# Patient Record
Sex: MALE | Race: WHITE | NOT HISPANIC OR LATINO | URBAN - METROPOLITAN AREA
[De-identification: names, ages, dates, MRNs, and addresses within clinical notes are randomized per-mention and may not be internally consistent; named-entity substitution may affect disease eponyms.]

---

## 2023-11-20 ENCOUNTER — EMERGENCY (EMERGENCY)
Facility: HOSPITAL | Age: 58
LOS: 1 days | Discharge: HOME | End: 2023-11-20
Attending: EMERGENCY MEDICINE
Payer: MEDICAID

## 2023-11-20 VITALS
OXYGEN SATURATION: 98 % | HEART RATE: 69 BPM | WEIGHT: 199.96 LBS | RESPIRATION RATE: 18 BRPM | TEMPERATURE: 98 F | DIASTOLIC BLOOD PRESSURE: 79 MMHG | SYSTOLIC BLOOD PRESSURE: 119 MMHG | HEIGHT: 70 IN

## 2023-11-20 PROCEDURE — 99284 EMERGENCY DEPT VISIT MOD MDM: CPT | Mod: 25

## 2023-11-20 PROCEDURE — 73610 X-RAY EXAM OF ANKLE: CPT

## 2023-11-20 PROCEDURE — 73630 X-RAY EXAM OF FOOT: CPT

## 2023-11-20 PROCEDURE — 73630 X-RAY EXAM OF FOOT: CPT | Mod: 26,RT

## 2023-11-20 PROCEDURE — 99284 EMERGENCY DEPT VISIT MOD MDM: CPT

## 2023-11-20 PROCEDURE — 73610 X-RAY EXAM OF ANKLE: CPT | Mod: 26,RT

## 2023-11-20 NOTE — ED PROVIDER NOTE - PROGRESS NOTE DETAILS
Patient relates having a distant injury to same ankle.  States that he was told his ligament was injured and that he would need surgery.  States he had surgery decades ago and feels that he will need it for this injury.  Discussed plan of care with orthopedic follow-up.  Patient declined pain medication in ED.  Normal neurovascular exam. Patient able to stand using [employer].  However states pain with walking.  Demonstrated crutch walking.  Educated on orthopedic follow-up.

## 2023-11-20 NOTE — ED ADULT TRIAGE NOTE - CCCP TRG CHIEF CMPLNT
ankle pain/injury I will SWITCH the dose or number of times a day I take the medications listed below when I get home from the hospital:  None

## 2023-11-20 NOTE — ED PROVIDER NOTE - OBJECTIVE STATEMENT
57-year-old male denies past medical history presenting with right ankle injury while walking in the morning.  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  States after half an hour of rest he had difficulty with walking due to pain.  Reports taking paracetamol 3 hours ago.  Denies any weakness or numbness.  Denies any past medical history.

## 2023-11-20 NOTE — ED ADULT NURSE NOTE - NSFALLUNIVINTERV_ED_ALL_ED
Call bell, personal items and telephone in reach/Instruct patient to call for assistance before getting out of bed/chair/stretcher/Non-slip footwear applied when patient is off stretcher/[place] to call system/Physically safe environment - no spills, clutter or unnecessary equipment/Purposeful proactive rounding/Room/bathroom lighting operational, light cord in reach

## 2023-11-20 NOTE — ED PROVIDER NOTE - PHYSICAL EXAMINATION
ttp overlying anterior talofibular ligament  mild ankle swelling to lateral aspect rt ankle  no effusion or ecchymosis

## 2023-11-20 NOTE — ED PROVIDER NOTE - PATIENT PORTAL LINK FT
You can access the FollowMyHealth Patient Portal offered by [facility] by registering at the following website: [email or web address]. By joining [facility]’s FollowMyHealth portal, you will also be able to view your health information using other applications (apps) compatible with our system.

## 2023-11-20 NOTE — ED PROVIDER NOTE - NSFOLLOWUPINSTRUCTIONS_ED_ALL_ED_FT
Ankle Sprain    WHAT YOU NEED TO KNOW:    An ankle sprain happens when 1 or more ligaments in your ankle joint stretch or tear. Ligaments are tough tissues that connect bones. Ligaments support your joints and keep your bones in place.    DISCHARGE INSTRUCTIONS:    Seek care immediately if:    You have severe pain in your ankle.    Your foot or toes are cold or numb.    Your ankle becomes more weak or unstable (wobbly).    You are unable to put any weight on your ankle or foot.    Your swelling has increased or returned.  Call your doctor if:    Your pain does not go away, even after treatment.    You have questions or concerns about your condition or care.  Medicines: You may need any of the following:    NSAIDs, such as ibuprofen, help decrease swelling, pain, and fever. This medicine is available with or without a doctor's order. NSAIDs can cause stomach bleeding or kidney problems in certain people. If you take blood thinner medicine, always ask your healthcare provider if NSAIDs are safe for you. Always read the medicine label and follow directions.    Acetaminophen decreases pain and fever. It is available without a doctor's order. Ask how much to take and how often to take it. Follow directions. Read the labels of all other medicines you are using to see if they also contain acetaminophen, or ask your doctor or pharmacist. Acetaminophen can cause liver damage if not taken correctly.    Prescription pain medicine may be given. Ask your healthcare provider how to take this medicine safely. Some prescription pain medicines contain acetaminophen. Do not take other medicines that contain acetaminophen without talking to your healthcare provider. Too much acetaminophen may cause liver damage. Prescription pain medicine may cause constipation. Ask your healthcare provider how to prevent or treat constipation.    Take your medicine as directed. Contact your healthcare provider if you think your medicine is not helping or if you have side effects. Tell your provider if you are allergic to any medicine. Keep a list of the medicines, vitamins, and herbs you take. Include the amounts, and when and why you take them. Bring the list or the pill bottles to follow-up visits. Carry your medicine list with you in case of an emergency.  Self-care:    Use support devices, such as a brace, cast, or splint, to limit your movement and protect your joint. You may need to use crutches to decrease your pain as you move around.    Go to physical therapy as directed. A physical therapist teaches you exercises to help improve movement and strength, and to decrease pain.    Rest your ankle so that it can heal. Return to normal activities as directed.    Apply ice on your ankle for 15 to 20 minutes every hour or as directed. Use an ice pack, or put crushed ice in a plastic bag. Cover the ice pack or bag with a towel before you put it on your injury. Ice helps prevent tissue damage and decreases swelling and pain.    Compress your ankle. Ask if you should wrap an elastic bandage around your injured ligament. An elastic bandage provides support and helps decrease swelling and movement so your joint can heal. Wear as long as directed.  How to Wrap an Elastic Bandage      Elevate your ankle above the level of your heart as often as you can. This will help decrease swelling and pain. Prop your ankle on pillows or blankets to keep it elevated comfortably.  Elevate Leg  Prevent another ankle sprain:    Let your ankle heal. Find out how long your ligament needs to heal. Do not do any physical activity until your healthcare provider says it is okay. If you start activity too soon, you may develop a more serious injury.    Warm up and stretch before you exercise or play sports. This helps your joints become strong and flexible.    Use the right equipment. Always wear shoes that fit well and are made for the activity that you are doing. You may also need ankle supports, elbow and knee pads, or braces.  Follow up with your doctor as directed: Write down your questions so you remember to ask them during your visits.